# Patient Record
Sex: FEMALE | Race: BLACK OR AFRICAN AMERICAN | Employment: STUDENT | URBAN - METROPOLITAN AREA
[De-identification: names, ages, dates, MRNs, and addresses within clinical notes are randomized per-mention and may not be internally consistent; named-entity substitution may affect disease eponyms.]

---
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
## 2023-03-17 ENCOUNTER — HOSPITAL ENCOUNTER (EMERGENCY)
Age: 18
Discharge: HOME | End: 2023-03-17
Attending: STUDENT IN AN ORGANIZED HEALTH CARE EDUCATION/TRAINING PROGRAM
Payer: MEDICAID

## 2023-03-17 VITALS
HEART RATE: 88 BPM | TEMPERATURE: 98.9 F | DIASTOLIC BLOOD PRESSURE: 79 MMHG | SYSTOLIC BLOOD PRESSURE: 120 MMHG | RESPIRATION RATE: 16 BRPM | OXYGEN SATURATION: 100 %

## 2023-03-17 DIAGNOSIS — G93.40 ENCEPHALOPATHY: Primary | ICD-10-CM

## 2023-03-17 LAB
ALBUMIN SERPL-MCNC: 3.9 G/DL (ref 3.5–5)
ALBUMIN/GLOB SERPL: 1.1 (ref 0.4–1.6)
ALP SERPL-CCNC: 52 U/L (ref 50–136)
ALT SERPL-CCNC: 13 U/L (ref 12–65)
AMPHET UR QL SCN: NEGATIVE
ANION GAP SERPL CALC-SCNC: 7 MMOL/L (ref 2–11)
APAP SERPL-MCNC: <2 UG/ML (ref 10–30)
APPEARANCE UR: CLEAR
AST SERPL-CCNC: 14 U/L (ref 15–37)
BACTERIA URNS QL MICRO: NEGATIVE /HPF
BARBITURATES UR QL SCN: NEGATIVE
BENZODIAZ UR QL: NEGATIVE
BILIRUB SERPL-MCNC: 0.4 MG/DL (ref 0.2–1.1)
BILIRUB UR QL: NEGATIVE
BILIRUB UR QL: NEGATIVE
BUN SERPL-MCNC: 12 MG/DL (ref 6–23)
CALCIUM SERPL-MCNC: 8.7 MG/DL (ref 8.3–10.4)
CANNABINOIDS UR QL SCN: POSITIVE
CASTS URNS QL MICRO: ABNORMAL /LPF
CHLORIDE SERPL-SCNC: 110 MMOL/L (ref 101–110)
CO2 SERPL-SCNC: 25 MMOL/L (ref 21–32)
COCAINE UR QL SCN: NEGATIVE
COLOR UR: ABNORMAL
CREAT SERPL-MCNC: 1 MG/DL (ref 0.6–1)
EKG ATRIAL RATE: 104 BPM
EKG DIAGNOSIS: NORMAL
EKG P AXIS: 79 DEGREES
EKG P-R INTERVAL: 134 MS
EKG Q-T INTERVAL: 340 MS
EKG QRS DURATION: 68 MS
EKG QTC CALCULATION (BAZETT): 447 MS
EKG R AXIS: 49 DEGREES
EKG T AXIS: 90 DEGREES
EKG VENTRICULAR RATE: 104 BPM
EPI CELLS #/AREA URNS HPF: ABNORMAL /HPF
ERYTHROCYTE [DISTWIDTH] IN BLOOD BY AUTOMATED COUNT: 13.3 % (ref 11.9–14.6)
ETHANOL SERPL-MCNC: <3 MG/DL (ref 0–0.08)
GLOBULIN SER CALC-MCNC: 3.6 G/DL (ref 2.8–4.5)
GLUCOSE SERPL-MCNC: 166 MG/DL (ref 65–100)
GLUCOSE UR QL STRIP.AUTO: NEGATIVE MG/DL
GLUCOSE UR STRIP.AUTO-MCNC: NEGATIVE MG/DL
HCG UR QL: NEGATIVE
HCT VFR BLD AUTO: 36.4 % (ref 35.8–46.3)
HGB BLD-MCNC: 11.5 G/DL (ref 11.7–15.4)
HGB UR QL STRIP: NEGATIVE
KETONES UR QL STRIP.AUTO: ABNORMAL MG/DL
KETONES UR-MCNC: NEGATIVE MG/DL
LEUKOCYTE ESTERASE UR QL STRIP.AUTO: NEGATIVE
LEUKOCYTE ESTERASE UR QL STRIP: NEGATIVE
MCH RBC QN AUTO: 26.9 PG (ref 26.1–32.9)
MCHC RBC AUTO-ENTMCNC: 31.6 G/DL (ref 31.4–35)
MCV RBC AUTO: 85 FL (ref 82–102)
METHADONE UR QL: NEGATIVE
NITRITE UR QL STRIP.AUTO: NEGATIVE
NITRITE UR QL: NEGATIVE
NRBC # BLD: 0 K/UL (ref 0–0.2)
OPIATES UR QL: NEGATIVE
PCP UR QL: NEGATIVE
PH UR STRIP: 7.5 (ref 5–9)
PH UR: 7.5 (ref 5–9)
PLATELET # BLD AUTO: 271 K/UL (ref 150–450)
PMV BLD AUTO: 9.8 FL (ref 9.4–12.3)
POTASSIUM SERPL-SCNC: 3.3 MMOL/L (ref 3.5–5.1)
PROT SERPL-MCNC: 7.5 G/DL (ref 6.3–8.2)
PROT UR QL: >300 MG/DL
PROT UR STRIP-MCNC: 100 MG/DL
RBC # BLD AUTO: 4.28 M/UL (ref 4.05–5.2)
RBC # UR STRIP: NEGATIVE
RBC #/AREA URNS HPF: ABNORMAL /HPF
SALICYLATES SERPL-MCNC: <1.7 MG/DL (ref 2.8–20)
SODIUM SERPL-SCNC: 142 MMOL/L (ref 133–143)
SP GR UR REFRACTOMETRY: 1.02 (ref 1–1.02)
SP GR UR: 1.02 (ref 1–1.02)
TROPONIN I SERPL HS-MCNC: 3.2 PG/ML (ref 0–37)
UROBILINOGEN UR QL STRIP.AUTO: 1 EU/DL (ref 0.2–1)
UROBILINOGEN UR QL: 1 EU/DL (ref 0.2–1)
WBC # BLD AUTO: 6 K/UL (ref 4.3–11.1)
WBC URNS QL MICRO: ABNORMAL /HPF

## 2023-03-17 PROCEDURE — 81025 URINE PREGNANCY TEST: CPT

## 2023-03-17 PROCEDURE — 80179 DRUG ASSAY SALICYLATE: CPT

## 2023-03-17 PROCEDURE — 81003 URINALYSIS AUTO W/O SCOPE: CPT

## 2023-03-17 PROCEDURE — 80053 COMPREHEN METABOLIC PANEL: CPT

## 2023-03-17 PROCEDURE — 99284 EMERGENCY DEPT VISIT MOD MDM: CPT

## 2023-03-17 PROCEDURE — 80307 DRUG TEST PRSMV CHEM ANLYZR: CPT

## 2023-03-17 PROCEDURE — 2580000003 HC RX 258: Performed by: PHYSICIAN ASSISTANT

## 2023-03-17 PROCEDURE — 84484 ASSAY OF TROPONIN QUANT: CPT

## 2023-03-17 PROCEDURE — 6370000000 HC RX 637 (ALT 250 FOR IP): Performed by: PHYSICIAN ASSISTANT

## 2023-03-17 PROCEDURE — 81001 URINALYSIS AUTO W/SCOPE: CPT

## 2023-03-17 PROCEDURE — 80143 DRUG ASSAY ACETAMINOPHEN: CPT

## 2023-03-17 PROCEDURE — 85027 COMPLETE CBC AUTOMATED: CPT

## 2023-03-17 PROCEDURE — 93005 ELECTROCARDIOGRAM TRACING: CPT | Performed by: PHYSICIAN ASSISTANT

## 2023-03-17 PROCEDURE — 82077 ASSAY SPEC XCP UR&BREATH IA: CPT

## 2023-03-17 RX ORDER — 0.9 % SODIUM CHLORIDE 0.9 %
1000 INTRAVENOUS SOLUTION INTRAVENOUS
Status: COMPLETED | OUTPATIENT
Start: 2023-03-17 | End: 2023-03-17

## 2023-03-17 RX ORDER — POTASSIUM CHLORIDE 20 MEQ/1
40 TABLET, EXTENDED RELEASE ORAL ONCE
Status: DISCONTINUED | OUTPATIENT
Start: 2023-03-17 | End: 2023-03-17

## 2023-03-17 RX ORDER — POTASSIUM CHLORIDE 20 MEQ/1
20 TABLET, EXTENDED RELEASE ORAL ONCE
Status: COMPLETED | OUTPATIENT
Start: 2023-03-17 | End: 2023-03-17

## 2023-03-17 RX ADMIN — SODIUM CHLORIDE 1000 ML: 9 INJECTION, SOLUTION INTRAVENOUS at 03:40

## 2023-03-17 RX ADMIN — POTASSIUM CHLORIDE 20 MEQ: 1500 TABLET, EXTENDED RELEASE ORAL at 05:49

## 2023-03-17 NOTE — ED NOTES
Pt left [facility] due to feeling stable enough to go home.  signed paperwork and I witnessed.  Informed pt that if she declines further please come back to isidoro Valdes RN  03/17/23 5926

## 2023-03-17 NOTE — ED TRIAGE NOTES
Arrives via ems for a racing heart . Pt does not verbally answer but will shake head yes/no. Ems reports that an hour before they picked her up there was an overdose call at that residence but that they were called back and the request was cancelled. Pt mother denies that pt has hx of drug use.  Hanny MARINO present at time of triage

## 2023-03-17 NOTE — CONSULTS
[facility] Hospitalist Consult   Admit Date:  3/17/2023  2:06 AM   Name:  Hemanth Solomon   Age:  21 y.o. Sex:  female  :  2005   MRN:  [id number]   Room:  [place] [address]    Presenting Complaint: Palpitations    Reason(s) for Admission: No admission diagnoses are documented for this encounter. Hospitalists consulted by No att. providers found for: admission of pt    History of Presenting Illness:   Hemanth Solomon is a 21 y.o. female with no significant prior past medical history was brought to the ER by EMS because of palpitations. Patient was initially not able to speak anything for the past 6 to 7 hours in the ER. This morning, she was able to speak a little bit but does not remember what happened last night. Patient's friend was at bedside. I was asked by ER physician to admit this patient. I saw and evaluated the patient at bedside. Patient is alert awake oriented x3. She is answering questions appropriately. She stated that last night she had a headache and took 3 Tylenol pills. She also had some chest pain last night but none currently. EKG on admission shows sinus tachycardia with heart rate of 100/min. No ST-T wave changes suggestive of ischemia. Urine drug screen results came back positive for marijuana. Patient was counseled to quit illicit drugs. She is alert awake oriented x3. Recommended admission for further evaluation management. Patient stated that she is stable enough to go home and did not want to be admitted to the hospital.  Risks of leaving the hospital  [address] discussed with the patient including death. She understands. Decided to leave  [address]. Assessment & Plan:     Metabolic encephalopathy resolved  Likely from polysubstance use. Urine drug screen positive for marijuana. Counseled to quit.       Diet:  No diet orders on file  VTE prophylaxis: No VTE prophylaxis needed  Code status: No Order    Active Problems:    * No active hospital problems. *  Resolved Problems:    * No resolved hospital problems. *      Past History:  Past Medical History:   Diagnosis Date    Osgood-Schlatter's disease of left lower extremity     Stress fracture of tibia     Vitamin D deficiency 10/24/2016       No past surgical history on file. Social History     Tobacco Use    Smoking status: Unknown    Smokeless tobacco: Never   Substance Use Topics    Alcohol use: No      Social History     Substance and Sexual Activity   Drug Use No       No family history on file. Immunization History   Administered Date(s) Administered    DTaP vaccine 01/30/2004, 03/29/2004, 06/01/2004, 04/19/2005, 09/21/2009    HPV (Human Papilloma Virus)Vaccine 11/20/2015, 10/07/2016    Hepatitis B vaccine 2005, 01/30/2004, 03/29/2004, 06/01/2004    Hib vaccine 01/30/2004, 03/29/2004, 06/01/2004, 04/19/2005, 10/22/2007    MMR 04/19/2005, 10/22/2007    Meningococcal B, Recombinant Emily Grow) 08/02/2017    Meningococcal MCV4O (Menveo) 11/20/2015    Pneumococcal Vaccine 01/30/2004, 03/29/2004, 01/25/2005    Polio Virus Vaccine 01/30/2004, 03/29/2004, 06/01/2004, 10/22/2007    Tdap (Boostrix, Adacel) 11/20/2015    Varicella (Varivax) 01/25/2005, 07/24/2009     No Known Allergies   No current facility-administered medications for this encounter. Current Outpatient Medications   Medication Sig    ergocalciferol (ERGOCALCIFEROL) 1.25 MG (06242 UT) capsule Take 50,000 Units by mouth every 7 days       Objective:   Patient Vitals for the past 24 hrs:   Temp Pulse Resp BP SpO2   03/17/23 0641 98.9 °F (37.2 °C) 88 -- -- 100 %   03/17/23 0147 -- (!) 124 16 120/79 99 %       Oxygen Therapy  SpO2: 100 %  O2 Device: None (Room air)    Estimated body mass index is 17.28 kg/m² as calculated from the following:    Height as of 10/21/21: 5' 1.25\" (1.556 m). Weight as of 10/21/21: 92 lb 3.2 oz (41.8 kg).   No intake or output data in the 24 hours ending 03/17/23 1109      Physical Exam:    Blood pressure 120/79, pulse 88, temperature 98.9 °F (37.2 °C), temperature source Oral, resp. rate 16, SpO2 100 %. General:    Well nourished. Alert awake oriented x3, no overt distress,  Head:  Normocephalic, atraumatic  Eyes:  Sclerae appear normal.  Pupils equally round. ENT:  Nares appear normal.  Moist oral mucosa  Neck:  No restricted ROM. Trachea midline   CV:   RRR. No m/r/g. No jugular venous distension. Lungs:   CTAB. No wheezing, rhonchi, or rales. Symmetric expansion. Abdomen:   Soft, nontender, nondistended. Extremities: No cyanosis or clubbing. No edema  Skin:     No rashes and normal coloration. Warm and dry. Neuro:  CN II-XII grossly intact. Alert awake oriented x3, no focal neurological deficits. Sensation intact. Psych:  Normal mood and affect.       I have personally reviewed labs and tests showing:  Recent Labs:  Recent Results (from the past 24 hour(s))   EKG 12 Lead    Collection Time: 03/17/23  1:54 AM   Result Value Ref Range    Ventricular Rate 104 BPM    Atrial Rate 104 BPM    P-R Interval 134 ms    QRS Duration 68 ms    Q-T Interval 340 ms    QTc Calculation (Bazett) 447 ms    P Axis 79 degrees    R Axis 49 degrees    T Axis 90 degrees    Diagnosis Sinus tachycardia    CBC    Collection Time: 03/17/23  2:12 AM   Result Value Ref Range    WBC 6.0 4.3 - 11.1 K/uL    RBC 4.28 4.05 - 5.2 M/uL    Hemoglobin 11.5 (L) 11.7 - 15.4 g/dL    Hematocrit 36.4 35.8 - 46.3 %    MCV 85.0 82 - 102 FL    MCH 26.9 26.1 - 32.9 PG    MCHC 31.6 31.4 - 35.0 g/dL    RDW 13.3 11.9 - 14.6 %    Platelets 130 413 - 705 K/uL    MPV 9.8 9.4 - 12.3 FL    nRBC 0.00 0.0 - 0.2 K/uL   Comprehensive Metabolic Panel    Collection Time: 03/17/23  2:12 AM   Result Value Ref Range    Sodium 142 133 - 143 mmol/L    Potassium 3.3 (L) 3.5 - 5.1 mmol/L    Chloride 110 101 - 110 mmol/L    CO2 25 21 - 32 mmol/L    Anion Gap 7 2 - 11 mmol/L    Glucose 166 (H) 65 - 100 mg/dL    BUN 12 6 - 23 MG/DL Creatinine 1.00 0.6 - 1.0 MG/DL    Est, Glom Filt Rate >60 >60 ml/min/1.73m2    Calcium 8.7 8.3 - 10.4 MG/DL    Total Bilirubin 0.4 0.2 - 1.1 MG/DL    ALT 13 12 - 65 U/L    AST 14 (L) 15 - 37 U/L    Alk Phosphatase 52 50 - 136 U/L    Total Protein 7.5 6.3 - 8.2 g/dL    Albumin 3.9 3.5 - 5.0 g/dL    Globulin 3.6 2.8 - 4.5 g/dL    Albumin/Globulin Ratio 1.1 0.4 - 1.6     Troponin    Collection Time: 03/17/23  2:12 AM   Result Value Ref Range    Troponin, High Sensitivity 3.2 0 - 37 pg/mL   Salicylate    Collection Time: 03/17/23  2:12 AM   Result Value Ref Range    Salicylate, Serum <5.5 (L) 2.8 - 20.0 MG/DL   Acetaminophen Level    Collection Time: 03/17/23  2:12 AM   Result Value Ref Range    Acetaminophen Level <2 (L) 10.0 - 30.0 ug/mL   Ethanol    Collection Time: 03/17/23  2:12 AM   Result Value Ref Range    Ethanol Lvl <3 MG/DL   Urine Drug Screen    Collection Time: 03/17/23  5:57 AM   Result Value Ref Range    PCP, Urine Negative NEG      Benzodiazepines, Urine Negative NEG      Cocaine, Urine Negative NEG      Amphetamine, Urine Negative NEG      Methadone, Urine Negative NEG      THC, TH-Cannabinol, Urine Positive (A) NEG      Opiates, Urine Negative NEG      Barbiturates, Urine Negative NEG     POCT Urinalysis no Micro    Collection Time: 03/17/23  6:04 AM   Result Value Ref Range    Specific Gravity, Urine, POC 1.025 (H) 1.001 - 1.023      pH, Urine, POC 7.5 5.0 - 9.0      Protein, Urine, POC >300 (A) NEG mg/dL    Glucose, UA POC Negative NEG mg/dL    Ketones, Urine, POC Negative NEG mg/dL    Bilirubin, Urine, POC Negative NEG      Blood, UA POC Negative NEG      URINE UROBILINOGEN POC 1.0 0.2 - 1.0 EU/dL    Nitrite, Urine, POC Negative NEG      Leukocyte Est, UA POC Negative NEG     POC Pregnancy Urine Qual    Collection Time: 03/17/23  6:06 AM   Result Value Ref Range    Preg Test, Ur Negative NEG     Urinalysis    Collection Time: 03/17/23  6:09 AM   Result Value Ref Range    Color, UA YELLOW/STRAW Appearance CLEAR      Specific Gravity, UA 1.019 1.001 - 1.023      pH, Urine 7.5 5.0 - 9.0      Protein,  (A) NEG mg/dL    Glucose, UA Negative mg/dL    Ketones, Urine TRACE (A) NEG mg/dL    Bilirubin Urine Negative NEG      Blood, Urine Negative NEG      Urobilinogen, Urine 1.0 0.2 - 1.0 EU/dL    Nitrite, Urine Negative NEG      Leukocyte Esterase, Urine Negative NEG      WBC, UA 0-4 U4 /hpf    RBC, UA 0-5 U5 /hpf    Epithelial Cells UA 0-5 U5 /hpf    BACTERIA, URINE Negative NEG /hpf    Casts 0-2 U2 /lpf       I have personally reviewed imaging studies showing:  No results found. Echocardiogram:  No results found for this or any previous visit. Signed:  Adilene Delaney MD    Part of this note may have been written by using a voice dictation software. The note has been proof read but may still contain some grammatical/other typographical errors.

## 2023-03-17 NOTE — ED PROVIDER NOTES
Emergency Department Provider Note                   PCP:                Vania Lazo MD               Age: 17 y.o.      Sex: female     DISPOSITION Decision To Admit 03/17/2023 08:47:40 AM       ICD-10-CM    1. Encephalopathy  G93.40           MEDICAL DECISION MAKING  Complexity of Problems Addressed:  1 undiagnosed new problem with uncertain prognosis    Data Reviewed and Analyzed:  Category 1:   I reviewed records from an external source: provider visit notes from PCP.  I ordered each unique test.  I interpreted the results of each unique test.        Category 2:   ED EKG was independently interpreted in the absence of a cardiologist.  Rate: 104  EKG Interpretation: EKG Interpretation: sinus rhythm  ST Segments: Nonspecific ST segments - NO STEMI    I independently ordered and interpreted the EKG.  I independently ordered and interpreted the labs    Category 3: Discussion of management or test interpretation.  Patient is a 17-year-old female who came in via EMS for concerns of racing heart.  Patient was essentially nonverbal for the past 6-1/2 to 7 hours in the department.  I finally got patient to speak to me a little bit but she is unable to recall any events of the night other than at one point being in an ambulance.  She states she does feel confused.  I spoke with family who is unable to contribute any more to the history.  Given this acute encephalopathy I have spoken with hospitalist group for admission at this time.  I have informed the patient of this plan and she is agreeable.    ED Course as of 03/17/23 0848   Fri Mar 17, 2023   0307 Potassium(!): 3.3  40 mEq PO given [TT]      ED Course User Index  [TT] Renny Perez PA-C       Risk of Complications and/or Morbidity of Patient Management:  Patient was admitted I have communication with admitting physician     Is this patient to be included in the SEP-1 core measure due to severe sepsis or septic shock? No Exclusion criteria - the patient is NOT to  be included for SEP-1 Core Measure due to: Infection is not suspected     Quinton Gold is a 21 y.o. female who presents to the Emergency Department with chief complaint of    Chief Complaint   Patient presents with    Palpitations      Patient is a 27-year-old female who presents to facility today via EMS with concern of her heart racing. EMS reports patient has been nonverbal the entire ride to the emergency department. They report her vitals are stable in route but patient was noncontributory to any questions. Patient continues to be nonverbal here in the department. She is making various hand gestures that cannot be interpreted. I have tried to express the need for the patient to share what is happening so we can try and treat appropriately. Offered the patient to write her responses which she also would not do. I then called and spoke with patient's mother who was only able to contribute that the patient woke her up to tell her that if she felt her heart was racing. Mother reports she was talking normally before leaving with EMS. Mother states she does not know if the patient uses any drugs, smokes, drinks. Mother reports to her knowledge she has no chronic health issues. EMS does report that there was a prior called for overdose to that same address that was canceled before this call came in about 20 minutes later. The history is provided by the EMS personnel. Review of Systems   Unable to perform ROS: Other (patient won't speak)   Cardiovascular:  Positive for palpitations. All other systems reviewed and are negative. Vitals signs and nursing note reviewed:  Patient Vitals for the past 4 hrs:   Temp Pulse SpO2   03/17/23 0641 98.9 °F (37.2 °C) 88 100 %          Physical Exam  Vitals and nursing note reviewed. Constitutional:       General: She is not in acute distress. Appearance: Normal appearance. She is not ill-appearing or toxic-appearing.    HENT:      Head: Normocephalic and atraumatic. Right Ear: External ear normal.      Left Ear: External ear normal.   Eyes:      Extraocular Movements: Extraocular movements intact. Conjunctiva/sclera: Conjunctivae normal.   Cardiovascular:      Rate and Rhythm: Normal rate and regular rhythm. Pulses: Normal pulses. Heart sounds: Normal heart sounds. Pulmonary:      Effort: Pulmonary effort is normal.      Breath sounds: Normal breath sounds. No wheezing, rhonchi or rales. Chest:      Chest wall: No tenderness. Abdominal:      General: Abdomen is flat. Bowel sounds are normal. There is no distension. Palpations: Abdomen is soft. Tenderness: There is no abdominal tenderness. There is no guarding or rebound. Musculoskeletal:         General: Normal range of motion. Cervical back: Normal range of motion. Skin:     General: Skin is warm and dry. Capillary Refill: Capillary refill takes less than 2 seconds. Neurological:      General: No focal deficit present. Mental Status: She is alert and oriented to person, place, and time.    Psychiatric:         Mood and Affect: Mood normal.         Behavior: Behavior normal.        Procedures    ED Course as of 03/17/23 0848   Fri Mar 17, 2023   0307 Potassium(!): 3.3  40 mEq PO given [TT]      ED Course User Index  [TT] Aniket Dewey PA-C        Orders Placed This Encounter   Procedures    CBC    Comprehensive Metabolic Panel    Urine Drug Screen    Troponin    Salicylate    Acetaminophen Level    Ethanol    Urinalysis    POCT Urine Dipstick    POC PREGNANCY UR-QUAL    POCT Urinalysis no Micro    POC Pregnancy Urine Qual    EKG 12 Lead        Medications   0.9 % sodium chloride bolus (0 mLs IntraVENous Stopped 3/17/23 0607)   potassium chloride (KLOR-CON M) extended release tablet 20 mEq (20 mEq Oral Given 3/17/23 0549)       New Prescriptions    No medications on file        Past Medical History:   Diagnosis Date    Osgood-Schlatter's disease of left lower extremity     Stress fracture of tibia     Vitamin D deficiency 10/24/2016        No past surgical history on file.     No family history on file.     Social History     Socioeconomic History    Marital status: Single   Tobacco Use    Smoking status: Unknown    Smokeless tobacco: Never   Substance and Sexual Activity    Alcohol use: No    Drug use: No        Allergies: Patient has no known allergies.    Previous Medications    ERGOCALCIFEROL (ERGOCALCIFEROL) 1.25 MG (57112 UT) CAPSULE    Take 50,000 Units by mouth every 7 days        Results for orders placed or performed during the hospital encounter of 03/17/23   CBC   Result Value Ref Range    WBC 6.0 4.3 - 11.1 K/uL    RBC 4.28 4.05 - 5.2 M/uL    Hemoglobin 11.5 (L) 11.7 - 15.4 g/dL    Hematocrit 36.4 35.8 - 46.3 %    MCV 85.0 82 - 102 FL    MCH 26.9 26.1 - 32.9 PG    MCHC 31.6 31.4 - 35.0 g/dL    RDW 13.3 11.9 - 14.6 %    Platelets 271 150 - 450 K/uL    MPV 9.8 9.4 - 12.3 FL    nRBC 0.00 0.0 - 0.2 K/uL   Comprehensive Metabolic Panel   Result Value Ref Range    Sodium 142 133 - 143 mmol/L    Potassium 3.3 (L) 3.5 - 5.1 mmol/L    Chloride 110 101 - 110 mmol/L    CO2 25 21 - 32 mmol/L    Anion Gap 7 2 - 11 mmol/L    Glucose 166 (H) 65 - 100 mg/dL    BUN 12 6 - 23 MG/DL    Creatinine 1.00 0.6 - 1.0 MG/DL    Est, Glom Filt Rate >60 >60 ml/min/1.73m2    Calcium 8.7 8.3 - 10.4 MG/DL    Total Bilirubin 0.4 0.2 - 1.1 MG/DL    ALT 13 12 - 65 U/L    AST 14 (L) 15 - 37 U/L    Alk Phosphatase 52 50 - 136 U/L    Total Protein 7.5 6.3 - 8.2 g/dL    Albumin 3.9 3.5 - 5.0 g/dL    Globulin 3.6 2.8 - 4.5 g/dL    Albumin/Globulin Ratio 1.1 0.4 - 1.6     Urine Drug Screen   Result Value Ref Range    PCP, Urine Negative NEG      Benzodiazepines, Urine Negative NEG      Cocaine, Urine Negative NEG      Amphetamine, Urine Negative NEG      Methadone, Urine Negative NEG      THC, TH-Cannabinol, Urine Positive (A) NEG      Opiates, Urine Negative NEG      Barbiturates, Urine Negative  NEG     Troponin   Result Value Ref Range    Troponin, High Sensitivity 3.2 0 - 37 pg/mL   Salicylate   Result Value Ref Range    Salicylate, Serum <1.1 (L) 2.8 - 20.0 MG/DL   Acetaminophen Level   Result Value Ref Range    Acetaminophen Level <2 (L) 10.0 - 30.0 ug/mL   Ethanol   Result Value Ref Range    Ethanol Lvl <3 MG/DL   Urinalysis   Result Value Ref Range    Color, UA YELLOW/STRAW      Appearance CLEAR      Specific Gravity, UA 1.019 1.001 - 1.023      pH, Urine 7.5 5.0 - 9.0      Protein,  (A) NEG mg/dL    Glucose, UA Negative mg/dL    Ketones, Urine TRACE (A) NEG mg/dL    Bilirubin Urine Negative NEG      Blood, Urine Negative NEG      Urobilinogen, Urine 1.0 0.2 - 1.0 EU/dL    Nitrite, Urine Negative NEG      Leukocyte Esterase, Urine Negative NEG      WBC, UA 0-4 U4 /hpf    RBC, UA 0-5 U5 /hpf    Epithelial Cells UA 0-5 U5 /hpf    BACTERIA, URINE Negative NEG /hpf    Casts 0-2 U2 /lpf   POCT Urinalysis no Micro   Result Value Ref Range    Specific Gravity, Urine, POC 1.025 (H) 1.001 - 1.023      pH, Urine, POC 7.5 5.0 - 9.0      Protein, Urine, POC >300 (A) NEG mg/dL    Glucose, UA POC Negative NEG mg/dL    Ketones, Urine, POC Negative NEG mg/dL    Bilirubin, Urine, POC Negative NEG      Blood, UA POC Negative NEG      URINE UROBILINOGEN POC 1.0 0.2 - 1.0 EU/dL    Nitrite, Urine, POC Negative NEG      Leukocyte Est, UA POC Negative NEG     POC Pregnancy Urine Qual   Result Value Ref Range    Preg Test, Ur Negative NEG     EKG 12 Lead   Result Value Ref Range    Ventricular Rate 104 BPM    Atrial Rate 104 BPM    P-R Interval 134 ms    QRS Duration 68 ms    Q-T Interval 340 ms    QTc Calculation (Bazett) 447 ms    P Axis 79 degrees    R Axis 49 degrees    T Axis 90 degrees    Diagnosis Sinus tachycardia         No orders to display                     Voice dictation software was used during the making of this note.   This software is not perfect and grammatical and other typographical errors may be present. This note has not been completely proofread for errors.      [employer], SANDIE  03/17/23 [id number]

## 2024-01-04 ENCOUNTER — HOSPITAL ENCOUNTER (EMERGENCY)
Age: 19
Discharge: HOME | End: 2024-01-04

## 2024-01-04 ENCOUNTER — APPOINTMENT (OUTPATIENT)
Dept: CT IMAGING | Age: 19
End: 2024-01-04

## 2024-01-04 VITALS
WEIGHT: 90 LBS | OXYGEN SATURATION: 100 % | HEART RATE: 105 BPM | DIASTOLIC BLOOD PRESSURE: 67 MMHG | RESPIRATION RATE: 16 BRPM | HEIGHT: 63 IN | TEMPERATURE: 98.3 F | BODY MASS INDEX: 15.95 KG/M2 | SYSTOLIC BLOOD PRESSURE: 98 MMHG

## 2024-01-04 DIAGNOSIS — R11.2 NAUSEA AND VOMITING, UNSPECIFIED VOMITING TYPE: ICD-10-CM

## 2024-01-04 DIAGNOSIS — R10.84 GENERALIZED ABDOMINAL PAIN: Primary | ICD-10-CM

## 2024-01-04 LAB
ALBUMIN SERPL-MCNC: 4.1 G/DL (ref 3.5–5)
ALBUMIN/GLOB SERPL: 1.1 (ref 0.4–1.6)
ALP SERPL-CCNC: 49 U/L (ref 50–130)
ALT SERPL-CCNC: 16 U/L (ref 12–65)
ANION GAP SERPL CALC-SCNC: 6 MMOL/L (ref 2–11)
AST SERPL-CCNC: 13 U/L (ref 15–37)
BASOPHILS # BLD: 0 K/UL (ref 0–0.2)
BASOPHILS NFR BLD: 0 % (ref 0–2)
BILIRUB SERPL-MCNC: 0.4 MG/DL (ref 0.2–1.1)
BUN SERPL-MCNC: 14 MG/DL (ref 6–23)
CALCIUM SERPL-MCNC: 8.9 MG/DL (ref 8.3–10.4)
CHLORIDE SERPL-SCNC: 108 MMOL/L (ref 103–113)
CO2 SERPL-SCNC: 24 MMOL/L (ref 21–32)
CREAT SERPL-MCNC: 0.87 MG/DL (ref 0.6–1)
DIFFERENTIAL METHOD BLD: ABNORMAL
EOSINOPHIL # BLD: 0 K/UL (ref 0–0.8)
EOSINOPHIL NFR BLD: 0 % (ref 0.5–7.8)
ERYTHROCYTE [DISTWIDTH] IN BLOOD BY AUTOMATED COUNT: 12.8 % (ref 11.9–14.6)
FLUAV RNA SPEC QL NAA+PROBE: NOT DETECTED
FLUBV RNA SPEC QL NAA+PROBE: NOT DETECTED
GLOBULIN SER CALC-MCNC: 3.9 G/DL (ref 2.8–4.5)
GLUCOSE SERPL-MCNC: 98 MG/DL (ref 65–100)
HCG UR QL: NEGATIVE
HCT VFR BLD AUTO: 41 % (ref 35.8–46.3)
HGB BLD-MCNC: 13.3 G/DL (ref 11.7–15.4)
IMM GRANULOCYTES # BLD AUTO: 0 K/UL (ref 0–0.5)
IMM GRANULOCYTES NFR BLD AUTO: 0 % (ref 0–5)
LIPASE SERPL-CCNC: 101 U/L (ref 73–393)
LYMPHOCYTES # BLD: 0.6 K/UL (ref 0.5–4.6)
LYMPHOCYTES NFR BLD: 5 % (ref 13–44)
MCH RBC QN AUTO: 26.8 PG (ref 26.1–32.9)
MCHC RBC AUTO-ENTMCNC: 32.4 G/DL (ref 31.4–35)
MCV RBC AUTO: 82.7 FL (ref 82–102)
MONOCYTES # BLD: 0.7 K/UL (ref 0.1–1.3)
MONOCYTES NFR BLD: 6 % (ref 4–12)
NEUTS SEG # BLD: 10.6 K/UL (ref 1.7–8.2)
NEUTS SEG NFR BLD: 89 % (ref 43–78)
NRBC # BLD: 0 K/UL (ref 0–0.2)
PLATELET # BLD AUTO: 289 K/UL (ref 150–450)
PMV BLD AUTO: 9.8 FL (ref 9.4–12.3)
POTASSIUM SERPL-SCNC: 4.3 MMOL/L (ref 3.5–5.1)
PROT SERPL-MCNC: 8 G/DL (ref 6.3–8.2)
RBC # BLD AUTO: 4.96 M/UL (ref 4.05–5.2)
SARS-COV-2 RDRP RESP QL NAA+PROBE: NOT DETECTED
SODIUM SERPL-SCNC: 138 MMOL/L (ref 136–146)
SOURCE: NORMAL
WBC # BLD AUTO: 12 K/UL (ref 4.3–11.1)

## 2024-01-04 PROCEDURE — 6370000000 HC RX 637 (ALT 250 FOR IP): Performed by: PHYSICIAN ASSISTANT

## 2024-01-04 PROCEDURE — 83690 ASSAY OF LIPASE: CPT

## 2024-01-04 PROCEDURE — 85025 COMPLETE CBC W/AUTO DIFF WBC: CPT

## 2024-01-04 PROCEDURE — 87635 SARS-COV-2 COVID-19 AMP PRB: CPT

## 2024-01-04 PROCEDURE — 81025 URINE PREGNANCY TEST: CPT

## 2024-01-04 PROCEDURE — 96361 HYDRATE IV INFUSION ADD-ON: CPT

## 2024-01-04 PROCEDURE — 6360000004 HC RX CONTRAST MEDICATION: Performed by: PHYSICIAN ASSISTANT

## 2024-01-04 PROCEDURE — 6360000002 HC RX W HCPCS: Performed by: PHYSICIAN ASSISTANT

## 2024-01-04 PROCEDURE — 2580000003 HC RX 258: Performed by: PHYSICIAN ASSISTANT

## 2024-01-04 PROCEDURE — 96374 THER/PROPH/DIAG INJ IV PUSH: CPT

## 2024-01-04 PROCEDURE — 80053 COMPREHEN METABOLIC PANEL: CPT

## 2024-01-04 PROCEDURE — 74177 CT ABD & PELVIS W/CONTRAST: CPT

## 2024-01-04 PROCEDURE — 87502 INFLUENZA DNA AMP PROBE: CPT

## 2024-01-04 PROCEDURE — 99285 EMERGENCY DEPT VISIT HI MDM: CPT

## 2024-01-04 RX ORDER — 0.9 % SODIUM CHLORIDE 0.9 %
100 INTRAVENOUS SOLUTION INTRAVENOUS
Status: DISCONTINUED | OUTPATIENT
Start: 2024-01-04 | End: 2024-01-04 | Stop reason: HOSPADM

## 2024-01-04 RX ORDER — SODIUM CHLORIDE 0.9 % (FLUSH) 0.9 %
10 SYRINGE (ML) INJECTION
Status: COMPLETED | OUTPATIENT
Start: 2024-01-04 | End: 2024-01-04

## 2024-01-04 RX ORDER — HYOSCYAMINE SULFATE 0.12 MG/1
1 TABLET SUBLINGUAL 3 TIMES DAILY PRN
Qty: 9 EACH | Refills: 0 | Status: SHIPPED | OUTPATIENT
Start: 2024-01-04 | End: 2024-01-07

## 2024-01-04 RX ORDER — 0.9 % SODIUM CHLORIDE 0.9 %
500 INTRAVENOUS SOLUTION INTRAVENOUS
Status: COMPLETED | OUTPATIENT
Start: 2024-01-04 | End: 2024-01-04

## 2024-01-04 RX ORDER — ONDANSETRON 4 MG/1
4 TABLET, ORALLY DISINTEGRATING ORAL 3 TIMES DAILY PRN
Qty: 9 TABLET | Refills: 0 | Status: SHIPPED | OUTPATIENT
Start: 2024-01-04 | End: 2024-01-07

## 2024-01-04 RX ORDER — KETOROLAC TROMETHAMINE 15 MG/ML
15 INJECTION, SOLUTION INTRAMUSCULAR; INTRAVENOUS
Status: COMPLETED | OUTPATIENT
Start: 2024-01-04 | End: 2024-01-04

## 2024-01-04 RX ORDER — 0.9 % SODIUM CHLORIDE 0.9 %
500 INTRAVENOUS SOLUTION INTRAVENOUS
Status: DISCONTINUED | OUTPATIENT
Start: 2024-01-04 | End: 2024-01-04

## 2024-01-04 RX ADMIN — SODIUM CHLORIDE 500 ML: 9 INJECTION, SOLUTION INTRAVENOUS at 14:42

## 2024-01-04 RX ADMIN — IOPAMIDOL 100 ML: 755 INJECTION, SOLUTION INTRAVENOUS at 16:35

## 2024-01-04 RX ADMIN — SODIUM CHLORIDE, PRESERVATIVE FREE 10 ML: 5 INJECTION INTRAVENOUS at 16:30

## 2024-01-04 RX ADMIN — HYOSCYAMINE SULFATE 125 MCG: 0.12 TABLET ORAL; SUBLINGUAL at 15:41

## 2024-01-04 RX ADMIN — KETOROLAC TROMETHAMINE 15 MG: 15 INJECTION, SOLUTION INTRAMUSCULAR; INTRAVENOUS at 16:30

## 2024-01-04 ASSESSMENT — PAIN SCALES - GENERAL
PAINLEVEL_OUTOF10: 1
PAINLEVEL_OUTOF10: 7
PAINLEVEL_OUTOF10: 5
PAINLEVEL_OUTOF10: 5

## 2024-01-04 ASSESSMENT — PAIN DESCRIPTION - LOCATION: LOCATION: ABDOMEN

## 2024-01-04 ASSESSMENT — PAIN - FUNCTIONAL ASSESSMENT: PAIN_FUNCTIONAL_ASSESSMENT: 0-10

## 2024-01-04 ASSESSMENT — LIFESTYLE VARIABLES
HOW OFTEN DO YOU HAVE A DRINK CONTAINING ALCOHOL: NEVER
HOW MANY STANDARD DRINKS CONTAINING ALCOHOL DO YOU HAVE ON A TYPICAL DAY: PATIENT DOES NOT DRINK

## 2024-01-04 NOTE — ED PROVIDER NOTES
Encounter   Procedures    COVID-19, Rapid    Influenza A/B, Molecular    CT ABDOMEN PELVIS W IV CONTRAST Additional Contrast? None    CBC with Auto Differential    CMP    Lipase    POCT Urine Dipstick    POC Pregnancy Urine Qual    POC Pregnancy Urine Qual        Medications given during this emergency department visit:  Medications   sodium chloride 0.9 % bolus 100 mL (has no administration in time range)   sodium chloride 0.9 % bolus 500 mL (0 mLs IntraVENous Stopped 1/4/24 1541)   hyoscyamine (LEVSIN/SL) sublingual tablet 125 mcg (125 mcg SubLINGual Given 1/4/24 1541)   ketorolac (TORADOL) injection 15 mg (15 mg IntraVENous Given 1/4/24 1630)   iopamidol (ISOVUE-370) 76 % injection 100 mL (100 mLs IntraVENous Given 1/4/24 1635)   sodium chloride flush 0.9 % injection 10 mL (10 mLs IntraVENous Given 1/4/24 1630)       New Prescriptions    HYOSCYAMINE SULFATE SL (LEVSIN/SL) 0.125 MG SUBL    Place 1 tablet under the tongue 3 times daily as needed (cramping)    ONDANSETRON (ZOFRAN-ODT) 4 MG DISINTEGRATING TABLET    Take 1 tablet by mouth 3 times daily as needed for Nausea or Vomiting        Past Medical History:   Diagnosis Date    Osgood-Schlatter's disease of left lower extremity     Stress fracture of tibia     Vitamin D deficiency 10/24/2016        No past surgical history on file.     Results for orders placed or performed during the hospital encounter of 01/04/24   COVID-19, Rapid    Specimen: Nasopharyngeal Swab   Result Value Ref Range    Source NASAL SWAB      SARS-CoV-2, Rapid Not detected NOTD     Influenza A/B, Molecular    Specimen: Nasopharyngeal   Result Value Ref Range    Influenza A, [place] Not detected NOTD      Influenza B, [place] Not detected NOTD     CT ABDOMEN PELVIS W IV CONTRAST Additional Contrast? None    Narrative    CT OF THE ABDOMEN AND PELVIS    INDICATION: Abdominal pain, vomiting, diarrhea    Multiple axial images were obtained through the abdomen and pelvis.  Oral  contrast was used for

## 2024-01-04 NOTE — ED TRIAGE NOTES
Patient woke up this morning with stomach pain. She has had diarrhea and emesis episodes this morning.

## 2024-01-04 NOTE — DISCHARGE INSTRUCTIONS
As discussed your workup today was reassuring. I suspect your symptoms are likely due to a virus.  Use the medications as needed for nausea or abdominal cramping.    Follow-up with your PCP in 1 to 2 days if no improvement.  Return to the ER for any new or worsening symptoms.

## 2024-01-05 NOTE — ED NOTES
I have reviewed discharge instructions with the patient.  The patient verbalized understanding.    Patient left ED via Discharge Method: ambulatory to Home with friend.    Opportunity for questions and clarification provided.       Patient given 2 scripts.         To continue your aftercare when you leave the hospital, you may receive an automated call from our care team to check in on how you are doing.  This is a free service and part of our promise to provide the best care and service to meet your aftercare needs.” If you have questions, or wish to unsubscribe from this service please call [phone].  Thank you for Choosing our [facility] Emergency Department.

## 2024-10-04 ENCOUNTER — HOSPITAL ENCOUNTER (EMERGENCY)
Age: 19
Discharge: HOME | End: 2024-10-04

## 2024-10-04 VITALS
SYSTOLIC BLOOD PRESSURE: 107 MMHG | HEART RATE: 115 BPM | OXYGEN SATURATION: 99 % | RESPIRATION RATE: 16 BRPM | DIASTOLIC BLOOD PRESSURE: 73 MMHG | WEIGHT: 90 LBS | BODY MASS INDEX: 16.56 KG/M2 | HEIGHT: 62 IN | TEMPERATURE: 99.4 F

## 2024-10-04 DIAGNOSIS — B34.9 VIRAL SYNDROME: Primary | ICD-10-CM

## 2024-10-04 LAB
FLUAV RNA SPEC QL NAA+PROBE: NOT DETECTED
FLUBV RNA SPEC QL NAA+PROBE: NOT DETECTED
SARS-COV-2 RDRP RESP QL NAA+PROBE: NOT DETECTED
SOURCE: NORMAL
STREP, MOLECULAR: NOT DETECTED

## 2024-10-04 PROCEDURE — 87651 STREP A DNA AMP PROBE: CPT

## 2024-10-04 PROCEDURE — 87502 INFLUENZA DNA AMP PROBE: CPT

## 2024-10-04 PROCEDURE — 87635 SARS-COV-2 COVID-19 AMP PRB: CPT

## 2024-10-04 PROCEDURE — 99283 EMERGENCY DEPT VISIT LOW MDM: CPT

## 2024-10-04 ASSESSMENT — PAIN DESCRIPTION - LOCATION: LOCATION: GENERALIZED

## 2024-10-04 ASSESSMENT — PAIN SCALES - GENERAL: PAINLEVEL_OUTOF10: 7

## 2024-10-05 NOTE — ED PROVIDER NOTES
Emergency Department Provider Note       PCP: Vania Lazo MD   Age: 18 y.o.   Sex: female     DISPOSITION Decision To Discharge 10/04/2024 10:39:30 PM  Condition at Disposition: Data Unavailable       ICD-10-CM    1. Viral syndrome  B34.9           Medical Decision Making          1 acute, uncomplicated illness or injury. 18-year-old female with mild upper respiratory symptoms started yesterday.  She has been using some sore throat spray with minimal relief.  Flu COVID and strep were negative in the ER.  Advised to use over-the-counter cough and cold medicines for symptomatic relief see primary care for routine recheck  Shared medical decision making was utilized in creating the patients health plan today.    I independently ordered and reviewed each unique test.       I interpreted the flu COVID strep negative.              History     Pt with congestion mild sore throat symptoms symptoms started yesterday, using spray w/o relief, no nausea vomiting diarrhea no chest pain or shortness of breath          ROS     Review of Systems   All other systems reviewed and are negative.       Physical Exam     Vitals signs and nursing note reviewed:  Vitals:    10/04/24 2130   BP: 107/73   Pulse: (!) 115   Resp: 16   Temp: 99.4 °F (37.4 °C)   TempSrc: Oral   SpO2: 99%   Weight: 40.8 kg (90 lb)   Height: 1.575 m (5' 2\")      Physical Exam  Vitals and nursing note reviewed.   Constitutional:       Appearance: Normal appearance. She is normal weight.   HENT:      Head: Normocephalic and atraumatic.      Right Ear: Tympanic membrane and external ear normal.      Left Ear: Tympanic membrane and external ear normal.      Nose: Congestion present.      Mouth/Throat:      Mouth: Mucous membranes are moist.      Pharynx: Oropharynx is clear. No oropharyngeal exudate or posterior oropharyngeal erythema.   Eyes:      Extraocular Movements: Extraocular movements intact.      Pupils: Pupils are equal, round, and reactive to